# Patient Record
Sex: FEMALE | Race: BLACK OR AFRICAN AMERICAN | Employment: UNEMPLOYED | URBAN - METROPOLITAN AREA
[De-identification: names, ages, dates, MRNs, and addresses within clinical notes are randomized per-mention and may not be internally consistent; named-entity substitution may affect disease eponyms.]

---

## 2020-04-27 ENCOUNTER — NURSE TRIAGE (OUTPATIENT)
Dept: NURSING | Facility: CLINIC | Age: 35
End: 2020-04-27

## 2020-04-27 NOTE — TELEPHONE ENCOUNTER
Diarrhea, nausea, vomiting, chills five days. Developed dry cough two days ago. No fever or shortness of breath.  Home care recommendations given for diarrhea and covid-19 symptom.  Patient asking to be tested.  Referred her to [email or web address] for new Covid symptom.    Lo Blum RN  [facility]    Reason for Disposition    1] COVID-19 infection diagnosed or suspected AND [2] mild symptoms (fever, cough) AND [2] no trouble breathing or other complications    Additional Information    Negative: Shock suspected (e.g., cold/pale/clammy skin, too weak to stand, low BP, rapid pulse)    Negative: Difficult to awaken or acting confused (e.g., disoriented, slurred speech)    Negative: Sounds like a life-threatening emergency to the triager    Negative: [1] SEVERE abdominal pain (e.g., excruciating) AND [2] present > 1 hour    Negative: [1] SEVERE abdominal pain AND [2] age > 60    Negative: [1] Blood in the stool AND [2] moderate or large amount of blood    Negative: Black or tarry bowel movements  (Exception: chronic-unchanged  black-grey bowel movements AND is taking iron pills or Pepto-bismol)    Negative: [1] Drinking very little AND [2] dehydration suspected (e.g., no urine > 12 hours, very dry mouth, very lightheaded)    Negative: Patient sounds very sick or weak to the triager    Negative: [1] SEVERE diarrhea (e.g., 7 or more times / day more than normal) AND [2]  age > 60 years    Negative: [1] Constant abdominal pain AND [2] present > 2 hours    Negative: [1] Fever > 103 F (39.4 C) AND [2] not able to get the fever down using Fever Care Advice    Negative: [1] SEVERE diarrhea (e.g., 7 or more times / day more than normal) AND [2] present > 24 hours (1 day)    Negative: [1] MODERATE diarrhea (e.g., 4-6 times / day more than normal) AND [2] present > 48 hours (2 days)    Negative: [1] MODERATE diarrhea (e.g., 4-6 times / day more than normal) AND [2] age > 70 years    Negative: Fever > 101 F (38.3 C)     Negative: Fever present > 3 days (72 hours)    Negative: Abdominal pain  (Exception: Pain clears with each passage of diarrhea stool)    Negative: [1] Blood in the stool AND [2] small amount of blood   (Exception: only on toilet paper. Reason: diarrhea can cause rectal irritation with blood on wiping)    Negative: [1] Mucus or pus in stool AND [2] present > 2 days AND [3] diarrhea is more than mild    Negative: [1] Recent antibiotic therapy (i.e., within last 2 months) AND [2] diarrhea present > 3 days since antibiotic was stopped    Negative: [1] Recent hospitalization AND [2] diarrhea present > 3 days    Negative: Weak immune system (e.g., HIV positive, cancer chemo, splenectomy, organ transplant, chronic steroids)    Negative: Tube feedings (e.g., nasogastric, g-tube, j-tube)    Negative: Travel to a foreign country in past month    Negative: [1] MILD diarrhea (e.g., 1-3 or more stools than normal in past 24 hours) without known cause AND [2] present >  7 days    Negative: Diarrhea is a chronic symptom (recurrent or ongoing AND present > 4 weeks)    Negative: SEVERE diarrhea (e.g., 7 or more times / day more than normal)    MILD-MODERATE diarrhea (e.g., 1-6 times / day more than normal)    Negative: SEVERE difficulty breathing (e.g., struggling for each breath, speaks in single words)    Negative: Difficult to awaken or acting confused (e.g., disoriented, slurred speech)    Negative: Bluish (or gray) lips or face now    Negative: Shock suspected (e.g., cold/pale/clammy skin, too weak to stand, low BP, rapid pulse)    Negative: Sounds like a life-threatening emergency to the triager    Negative: SEVERE or constant chest pain (Exception: mild central chest pain, present only when coughing)    Negative: MODERATE difficulty breathing (e.g., speaks in phrases, SOB even at rest, pulse 100-120)    Negative: Patient sounds very sick or weak to the triager    Negative: MILD difficulty breathing (e.g., minimal/no SOB at  rest, SOB with walking, pulse <100)    Negative: Chest pain    Negative: Fever > 103 F (39.4 C)    Negative: [1] Fever > 101 F (38.3 C) AND [2] age > 60    Negative: [1] Fever > 100.0 F (37.8 C) AND [2] bedridden (e.g., nursing home patient, CVA, chronic illness, recovering from surgery)    Negative: HIGH RISK patient (e.g., age > 64 years, diabetes, heart or lung disease, weak immune system)    Negative: Fever present > 3 days (72 hours)    Negative: [1] Fever returns after gone for over 24 hours AND [2] symptoms worse or not improved    Negative: [1] Continuous (nonstop) coughing interferes with work or school AND [2] no improvement using cough treatment per protocol    Negative: Cough present > 3 weeks    [facility] Specific Disposition  - REQUIRED: [facility] Specific Patient Instructions  COVID 19 Nurse Triage Plan/Patient Instructions    Please be aware that novel coronavirus (COVID-19) may be circulating in the community. If you develop symptoms such as fever, cough, or SOB or if you have concerns about the presence of another infection including coronavirus (COVID-19), please contact your health care provider or visit [email or web address].       Patient to stay at home and follow home care protocol based instructions.     Thank you for limiting contact with others, wearing a simple mask to cover your cough, practice good hand hygiene habits and accessing our virtual services where possible to limit the spread of this virus.    For more information about COVID19 and options for caring for yourself at home, please visit the CDC website at https://www.cdc.gov/coronavirus/2019-ncov/about/steps-when-sick.html  For more options for care at [facility], please visit our website at [email or web address]    For more information, please use the [place] Department of Health ([facility]) COVID-19 Hotlines (Interpreters available):   Health questions: Phone Number: [phone] or  [phone] and Hours: 7 a.m. to 7 p.m.  Schools and  questions: Phone Number: [phone] or [phone] and Hours 7 a.m. to 7 p.m.    Protocols used: CORONAVIRUS (COVID-19) DIAGNOSED OR [id number] 3.30.20, DIARRHEA-A-AH

## 2020-09-20 ENCOUNTER — APPOINTMENT (OUTPATIENT)
Dept: GENERAL RADIOLOGY | Facility: CLINIC | Age: 35
End: 2020-09-20
Attending: PHYSICIAN ASSISTANT
Payer: MEDICAID

## 2020-09-20 ENCOUNTER — HOSPITAL ENCOUNTER (EMERGENCY)
Facility: CLINIC | Age: 35
Discharge: HOME | End: 2020-09-20
Attending: PHYSICIAN ASSISTANT | Admitting: PHYSICIAN ASSISTANT
Payer: MEDICAID

## 2020-09-20 VITALS
HEIGHT: 63 IN | OXYGEN SATURATION: 96 % | SYSTOLIC BLOOD PRESSURE: 145 MMHG | WEIGHT: 245 LBS | HEART RATE: 82 BPM | TEMPERATURE: 98.3 F | DIASTOLIC BLOOD PRESSURE: 91 MMHG | RESPIRATION RATE: 16 BRPM | BODY MASS INDEX: 43.41 KG/M2

## 2020-09-20 DIAGNOSIS — S52.613A ULNA STYLOID FRACTURE, CLOSED: ICD-10-CM
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
## 2020-09-20 DIAGNOSIS — S52.509A DISTAL RADIAL FRACTURE: ICD-10-CM

## 2020-09-20 PROCEDURE — 40000275 ZZH STATISTIC RCP TIME EA 10 MIN

## 2020-09-20 PROCEDURE — 96374 THER/PROPH/DIAG INJ IV PUSH: CPT

## 2020-09-20 PROCEDURE — 40000277 XR SURGERY CARM FLUORO LESS THAN 5 MIN W STILLS: Mod: TC

## 2020-09-20 PROCEDURE — 99285 EMERGENCY DEPT VISIT HI MDM: CPT | Mod: 25

## 2020-09-20 PROCEDURE — 73100 X-RAY EXAM OF WRIST: CPT | Mod: LT

## 2020-09-20 PROCEDURE — 90471 IMMUNIZATION ADMIN: CPT

## 2020-09-20 PROCEDURE — 25605 CLTX DST RDL FX/EPHYS SEP W/: CPT

## 2020-09-20 PROCEDURE — 96375 TX/PRO/DX INJ NEW DRUG ADDON: CPT

## 2020-09-20 PROCEDURE — 90715 TDAP VACCINE 7 YRS/> IM: CPT | Performed by: PHYSICIAN ASSISTANT

## 2020-09-20 PROCEDURE — 96376 TX/PRO/DX INJ SAME DRUG ADON: CPT

## 2020-09-20 PROCEDURE — 25000128 H RX IP 250 OP 636

## 2020-09-20 PROCEDURE — 25000128 H RX IP 250 OP 636: Performed by: EMERGENCY MEDICINE

## 2020-09-20 PROCEDURE — 40000986 XR WRIST LEFT G/E 3 VIEWS

## 2020-09-20 PROCEDURE — 25000128 H RX IP 250 OP 636: Performed by: PHYSICIAN ASSISTANT

## 2020-09-20 RX ORDER — ONDANSETRON 2 MG/ML
4 INJECTION INTRAMUSCULAR; INTRAVENOUS ONCE
Status: COMPLETED | OUTPATIENT
Start: 2020-09-20 | End: 2020-09-20

## 2020-09-20 RX ORDER — IBUPROFEN 600 MG/1
600 TABLET, FILM COATED ORAL ONCE
Status: DISCONTINUED | OUTPATIENT
Start: 2020-09-20 | End: 2020-09-20

## 2020-09-20 RX ORDER — HYDROMORPHONE HYDROCHLORIDE 1 MG/ML
0.5 INJECTION, SOLUTION INTRAMUSCULAR; INTRAVENOUS; SUBCUTANEOUS
Status: DISCONTINUED | OUTPATIENT
Start: 2020-09-20 | End: 2020-09-20 | Stop reason: HOSPADM

## 2020-09-20 RX ORDER — ONDANSETRON 2 MG/ML
INJECTION INTRAMUSCULAR; INTRAVENOUS
Status: COMPLETED
Start: 2020-09-20 | End: 2020-09-20

## 2020-09-20 RX ORDER — PROPOFOL 10 MG/ML
80 INJECTION, EMULSION INTRAVENOUS ONCE
Status: COMPLETED | OUTPATIENT
Start: 2020-09-20 | End: 2020-09-20

## 2020-09-20 RX ORDER — ACETAMINOPHEN 325 MG/1
975 TABLET ORAL ONCE
Status: DISCONTINUED | OUTPATIENT
Start: 2020-09-20 | End: 2020-09-20

## 2020-09-20 RX ORDER — OXYCODONE HYDROCHLORIDE 5 MG/1
5 TABLET ORAL EVERY 6 HOURS PRN
Qty: 10 TABLET | Refills: 0 | Status: SHIPPED | OUTPATIENT
Start: 2020-09-20

## 2020-09-20 RX ORDER — OXYCODONE HYDROCHLORIDE 5 MG/1
5 TABLET ORAL ONCE
Status: DISCONTINUED | OUTPATIENT
Start: 2020-09-20 | End: 2020-09-20

## 2020-09-20 RX ADMIN — ONDANSETRON 4 MG: 2 INJECTION INTRAMUSCULAR; INTRAVENOUS at 09:48

## 2020-09-20 RX ADMIN — HYDROMORPHONE HYDROCHLORIDE 0.5 MG: 1 INJECTION, SOLUTION INTRAMUSCULAR; INTRAVENOUS; SUBCUTANEOUS at 11:04

## 2020-09-20 RX ADMIN — PROPOFOL 160 MG: 10 INJECTION, EMULSION INTRAVENOUS at 10:40

## 2020-09-20 RX ADMIN — CLOSTRIDIUM TETANI TOXOID ANTIGEN (FORMALDEHYDE INACTIVATED), CORYNEBACTERIUM DIPHTHERIAE TOXOID ANTIGEN (FORMALDEHYDE INACTIVATED), BORDETELLA PERTUSSIS TOXOID ANTIGEN (GLUTARALDEHYDE INACTIVATED), BORDETELLA PERTUSSIS FILAMENTOUS HEMAGGLUTININ ANTIGEN (FORMALDEHYDE INACTIVATED), BORDETELLA PERTUSSIS PERTACTIN ANTIGEN, AND BORDETELLA PERTUSSIS FIMBRIAE 2/3 ANTIGEN 0.5 ML: 5; 2; 2.5; 5; 3; 5 INJECTION, SUSPENSION INTRAMUSCULAR at 09:46

## 2020-09-20 RX ADMIN — HYDROMORPHONE HYDROCHLORIDE 0.5 MG: 1 INJECTION, SOLUTION INTRAMUSCULAR; INTRAVENOUS; SUBCUTANEOUS at 09:46

## 2020-09-20 ASSESSMENT — MIFFLIN-ST. JEOR: SCORE: 1780.44

## 2020-09-20 ASSESSMENT — ENCOUNTER SYMPTOMS: NUMBNESS: 1

## 2020-09-20 NOTE — ED AVS SNAPSHOT
[facility] Emergency Department  [address]  [facility] [address]  Phone:  [phone]  Fax:  [phone]                                    Kristin Mane   MRN: [id number]    Department:  [facility] Emergency Department   Date of Visit:  9/20/2020           After Visit Summary Signature Page    I have received my discharge instructions, and my questions have been answered. I have discussed any challenges I see with this plan with the nurse or doctor.    ..........................................................................................................................................  Patient/Patient Representative Signature      ..........................................................................................................................................  Patient Representative Print Name and Relationship to Patient    ..................................................               ................................................  Date                                   Time    ..........................................................................................................................................  Reviewed by Signature/Title    ...................................................              ..............................................  Date                                               Time          22EPIC Rev 08/18

## 2020-09-20 NOTE — ED PROVIDER NOTES
"History     Chief Complaint:  Wrist Pain    HPI  Kristin Mane is a right-handed 35 year old female who presents for evaluation of wrist pain. The patient states that she was stepping up on an exercise box when she fell backwards and landed on her wrist in dorsiflexion. She states that her entire hand feels numb. She did not hit her head and has no history of fracture/surgery in the wrist.    Allergies:  No Known Drug Allergies      Medications:   Zofran   Imodium    Medical History:   Past medical history reviewed. No pertinent medical history.     Surgical History   Surgical history reviewed. No pertinent surgical history.     Family History:   Family history reviewed. No pertinent family history.     Social History:  Patient was accompanied to the ED by her friend.  Smoking Status: Negative   Smokeless Tobacco: Negative   Alcohol Use: Negative   Drug Use: Negative     Review of Systems   Musculoskeletal:        Wrist pain   Neurological: Positive for numbness.   All other systems reviewed and are negative.    Physical Exam     Patient Vitals for the past 24 hrs:   BP Temp Pulse Resp SpO2 Height Weight   09/20/20 1242 -- -- -- 16 -- -- --   09/20/20 1230 (!) 145/91 -- 82 15 96 % -- --   09/20/20 1200 -- -- 77 13 100 % -- --   09/20/20 1159 (!) 141/83 -- 61 -- -- -- --   09/20/20 1115 135/81 -- 73 18 99 % -- --   09/20/20 1100 (!) 141/92 -- 76 (!) 33 100 % -- --   09/20/20 1057 134/82 -- 92 -- -- -- --   09/20/20 1054 (!) 145/90 -- 83 12 100 % -- --   09/20/20 1051 (!) 151/103 -- 80 (!) 38 100 % -- --   09/20/20 1048 (!) 144/99 -- 90 (!) 37 100 % -- --   09/20/20 1045 (!) 141/103 -- 96 9 100 % -- --   09/20/20 1040 (!) 134/99 -- 75 9 100 % -- --   09/20/20 1035 135/86 -- 73 13 100 % -- --   09/20/20 1030 132/82 -- 78 20 99 % -- --   09/20/20 1022 127/88 -- 78 -- 100 % -- --   09/20/20 1020 -- -- -- -- -- 1.6 m (5' 3\") 111.1 kg (245 lb)   09/20/20 1000 -- -- -- -- 100 % -- --   09/20/20 0930 (!) 147/95 -- -- -- 99 % " -- --   09/20/20 0927 (!) 147/95 98.3  F (36.8  C) 113 28 99 % -- --        Physical Exam  General: Well appearing, well nourished.  Appears uncomfortable, resting in bed.  Skin: Abrasion to the lateral aspect of the hand, dorsal side.  Superficial in nature.  No visualized bone to the wound.  HEENT: Head: Normocephalic, atraumatic, no visible masses.   Eyes: Conjunctiva clear.  Cardiac: Normal rate and regular rhythm, no murmur or gallop.   Lungs: Clear to auscultation.  Abdomen: Abdomen soft, non-tender. No rebound tenderness of guarding.   Musculoskeletal: Normal gait and station.  Left wrist / hand: Obvious deformity to the left wrist.  Tenderness throughout the distal ulna and radius.  No snuffbox tenderness.  No tenderness palpation throughout the remaining hand or finger bones.  Unable to have flexion-extension of the wrist due to pain.  Able to have movement of all digits of the left hand.  There are no sensory deficits, Median, Ulnar, and Radial nerve function is normal. Radial artery pulsations are normal. Capillary refill is normal.     Left elbow: No tenderness to palpation throughout the elbow.  Full flexion, extension, supination, pronation of elbow without difficulty.  Neurologic: Oriented x 3. GCS: 15.  Psychiatric: Intact recent and remote memory, judgment and insight, normal mood and affect.     Emergency Department Course     Imaging:  Radiology results were communicated with the patient who voiced understanding of the findings.    XR Wrist Left G/E 3 Views  Significant interval reduction in displacement of the  comminuted intra-articular distal radius fracture, there is residual  radial displacement of the distal articular fragments by as much as  one half bone width, and mild overriding along the radial aspect of  fracture. Persistent radial displacement of the ulnar styloid  fracture.    XR Wrist Left 2 Views  Comminuted displaced intra-articular fracture of the  distal radius. Articular  fragments are displaced dorsally and radially  by 1 bone width and there is substantial overriding of fragments.  Displaced fracture of the ulnar styloid, ulnar styloid is displaced  radially, dorsally, approximately along with the displaced radial  articular fragments.    JOIE SPEARS MD    Reading per radiology     Procedure:  Sedation and reduction - see Dr. Corona's note      Narrative: Sugartong splint        Splint was applied and after placement I checked and adjusted the fit to ensure proper positioning. Patient was more comfortable with splint in place. Sensation and circulation are intact after splint placement.  Interventions:   0946 Dilaudid 0.5 mg IV   0946 Tdap 0.5 mL IM  1948 Zofran 4 mg IV    1040 160 mg IV    Emergency Department Course:    0923 Nursing notes and vitals reviewed.    0928 I performed an exam of the patient as documented above.     1022 The patient was sent for XR while in the emergency department, results above.    1141 The patient was sent for a XR while in the emergency department, results above.      1155 I spoke with Dr. Dial of the orthopedic hand service regarding patient's presentation, findings, and plan of care.     I again spoke with Dr. Dial.     1031 Findings and plan explained to the Patient. Patient discharged home with instructions regarding supportive care, medications, and reasons to return. The importance of close follow-up was reviewed. The patient was prescribed as below.    Impression & Plan     Medical Decision Making:  Kristin Mane is a 35 year old female who presented to the ED for evaluation of left wrist pain after fall.  Details the patient's history can be known the HPI.  Differentials considered include sprain, strain, fracture, dislocation, septic joint, compartment syndrome, others.  On my exam, patient obvious deformity to the left wrist.  She is otherwise neurovascularly intact.  Plain films obtained as noted above, showing a comminuted  displaced intra-articular distal radius fracture as well as a ulnar styloid fracture.  Due to significant displacement, we did have sedation with reduction completed with Dr. Mensah.  Please see his note for details. Due to the unstable nature of the fracture, it was difficult maintaining the reduction.  She was placed in a sugar tong splint.  Postreduction films noted above, showing some improvement, but persistent radial angulation of the distal radius fracture.  Given the persistent angulation, I did touch base with orthopedics, who note that she will need surgery, they will call her in the morning to set up this for hopefully the next 2 days.  She is prophylactically been swabbed for COVID-19 asymptomatically, in anticipation for surgical correction.  After pain medications as noted above, reduction and casting, patient felt improvement in her symptoms.  We will discharge her with oxycodone.  She will otherwise continue Tylenol and ibuprofen, ice, elevation.  She is also placed in a sling.  Return to the ER for changing worsening symptoms, numbness or color change in the hand, new concerns.  No other injuries noted head to toe trauma exam.  All questions were answered prior to discharge.  She is in agreement with the treatment plan as above.      Diagnosis:     ICD-10-CM    1. Distal radial fracture  S52.509A    2. Ulna styloid fracture, closed  S52.613A    3. Fall, initial encounter  W19.XXXA         Disposition:  Discharged to home.    Discharge Medications:  Discharge Medication List as of 9/20/2020 12:33 PM      START taking these medications    Details   oxyCODONE (ROXICODONE) 5 MG tablet Take 1 tablet (5 mg) by mouth every 6 hours as needed for pain, Disp-10 tablet,R-0, Local Print             Dragon Disclosure   This was created at least in part with a voice recognition software. Mistakes/typos may be present.      Scribe Disclosure:  IAmrit, am serving as a scribe at 9:33 AM on 9/20/2020 to  document services personally performed by Shawnee BECKER based on my observations and the provider's statements to me.     [place]     Shawnee Flores PA  09/20/20 1324       Shawnee Flores PA  09/20/20 1327      ADDENDUM:  Patient was not swabbed for COVID-19 at discharge, she left before this could be completed.  Both myself and nursing staff attempted to call her number on file, which we had confirmed prior to her discharge.  However, this number went to an individual who is not the patient.  We also attempted to call her emergency contact, who's number went to busy signal.      Shawnee Flores PA  09/20/20 [id number]

## 2020-09-20 NOTE — PROGRESS NOTES
RT at bedside to assist MD with conscious sedation. Vital signs monitored throughout procedure, including heart rate, RR, SpO2 and ETCO2. BVM, suction, and nasal/oral airways at bedside. Patient tolerated procedure well and was awake and alert at the end of procedure.

## 2020-09-20 NOTE — ED PROVIDER NOTES
Emergency Department Attending Supervision Note  9/20/2020  9:37 AM      I evaluated this patient in conjunction with Shawnee Flores PA.      Briefly, the patient presented with left wrist pain and deformity after falling backwards when trying to step up on an exercise box. She notes numbness in her left hand. She did not hit her head or lose consciousness during the accident.     Tdap: 1993    General: Patient is alert, awake and interactive when I enter the room  Head: The scalp, face, and head appear normal  Eyes: Conjunctivae are normal  ENT: The nose is normal, Pinnae are normal, External acoustic canals are normal  Neck: Trachea midline  CV: Pulses are normal.   Resp: No respiratory distress   Musc: Obvious deformity to the left wrist with abrasion on the ulnar side that does not appear to be a open fracture.  Patient is distally neurovascularly intact.  Decreased range of motion at the wrist secondary to pain and deformity.  No cervical thoracic or lumbar midline tenderness.  Skin: Abrasion on the ulnar aspect of the wrist.  Neuro: Speech is normal and fluent. Face is symmetric.   Psych: Normal affect.  Appropriate interactions.    Results:  Imaging:  XR Wrist, Left, G/E 2 views:   Comminuted displaced intra-articular fracture of the distal radius. Articular fragments are displaced dorsally and radially by 1 bone width and there is substantial overriding of fragments. Displaced fracture of the ulnar styloid, ulnar styloid is displaced radially, dorsally, approximately along with the displaced radial articular fragments. As per radiology.     XR Wrist, Left, G/E 3 views:   Significant interval reduction in displacement of the comminuted intra-articular distal radius fracture, there is residual radial displacement of the distal articular fragments by as much as one half bone width, and mild overriding along the radial aspect of fracture. Persistent radial displacement of the ulnar styloid fracture. As  per radiology.    Laboratory:  Asymptomatic COVID-19: Pending    Procedures:   [facility] Procedure Note        Sedation     Performed by: Evangelist Corona MD  Authorized by: Evangelist Corona MD    Pre-Procedure Assessment done at 1030.    Expected Level:  Moderate Sedation    Indication:  Sedation is required to allow for fracture reduction    Consent obtained from patient after discussing the risks, benefits and alternatives.    PO Intake:  Appropriately NPO for procedure    ASA Class:  Class 1 - HEALTHY PATIENT    Mallampati:  Grade 1:  Soft palate, uvula, tonsillar pillars, and posterior pharyngeal wall visible    Lungs: Lungs Clear with good breath sounds bilaterally.     Heart: Normal heart sounds and rate    History and physical reviewed and no updates needed. I have reviewed the lab findings, diagnostic data, medications, and the plan for sedation. I have determined this patient to be an appropriate candidate for the planned sedation and procedure and have reassessed the patient IMMEDIATELY PRIOR to sedation and procedure.      Sedation Post Procedure Summary:    Prior to the start of the procedure and with procedural staff participation, I verbally confirmed the patient s identity using two indicators, relevant allergies, that the procedure was appropriate and matched the consent or emergent situation, and that the correct equipment/implants were available. Immediately prior to starting the procedure I conducted the Time Out with the procedural staff and re-confirmed the patient s name, procedure, and site/side. (The Joint Commission universal protocol was followed.)  Yes      Sedatives: Propofol    Vital signs, airway, End Tidal CO2 and pulse oximetry were monitored and remained stable throughout the procedure and sedation was maintained until the procedure was complete.  The patient was monitored by staff until sedation discharge criteria were met.    Patient tolerance: Patient  tolerated the procedure well with no immediate complications.    Time of sedation in minutes: 20 minutes from beginning to end of physician one to one monitoring.      Fracture Reduction     SITE: Left wrist     CONSENT: Risks and benefits, along with alternative treatment modalities, were discussed with the patient.  The patient consented to the procedure verbally and signed the proper paperwork.    ANESTHESIA:  The patient was consciously sedated by me, Dr. Corona (please see sedation note above)    TECHNIQUE: Traction was applied and angulation was recreated and reduced.  Good alignment was confirmed by fluoroscopy and post reduction films were obtained.  The patient tolerated the procedure well and there were no complications.     OUTCOME:  Rechecked the patient after sedation was no longer present, findings and plan explained to the patient. Patients status improved.  Pain was reduced and feeling more comfortably.     Medications:  0946 Dilaudid 0.5 mg IV  0946 Tdap 0.5 mL IM  0948 Zofran 4 mg IV   1040 Diprivan 160 mg IV    ED course:  Nursing notes and vitals reviewed. 0937 I performed an exam of the patient as documented above.     IV inserted. Medicine administered as documented above. Blood drawn. This was sent to the lab for further testing, results above.    The patient was sent for a left wrist x-ray while in the emergency department, findings above.     1030 I rechecked the patient and discussed the results of her workup thus far. I sedated the patient and reduced the fracture, see procedure notes above.     3121  Shawnee consulted with Dr. Dial of orthopedics, see her note for further details.     Patient was discharged home.    My impression  Patient presents with obvious deformity to the left wrist. X rays show comminuted displaced intra-articular distal radius fracture as well as a ulnar styloid fracture. Fracture was reduced as above. Postreduction films noted above, showing some improvement,  but persistent radial angulation of the distal radius fracture.  Given the persistent angulation and intraarticular nature of the fracture, Shawnee did touch base with orthopedics, who note that she will need surgery, they will call her in the morning to set up this for hopefully the next 2 days. Following the procedure, she is distally neurovascularly intact.     Diagnosis    ICD-10-CM    1. Distal radial fracture  S52.509A    2. Ulna styloid fracture, closed  S52.613A    3. Fall, initial encounter  W19.XXXA        Scribe Disclosure:  IShalini, am serving as a scribe at 9:36 AM on 9/20/2020 to document services personally performed by Evangelist Corona MD, based on my observations and the provider's statements to me.          Evangelist Corona MD  09/29/20 1551

## 2020-09-20 NOTE — ED NOTES
Attempted to contact patient to return for COVID swab that was not completed on this patient. Patient's primary contact information was the wrong number. Attempted to contact patient's sister, who is emergency contact, and got a busy signal.

## 2020-09-20 NOTE — SEDATION DOCUMENTATION
Patient given conscious sedation at the direction of Dr. Corona . Resp. Status was continuously monitored by Susi RT with normal ET CO2 with ranges in high 30's -39 maintained airway with no distress. VSS , left wrist which was displaced was realigned and casted . Wrist very unstable and diffecult to keep in alignment

## 2020-09-20 NOTE — DISCHARGE INSTRUCTIONS
You have broken the radius and ulna bones in your wrist. You can use Tylenol ibuprofen first for pain control. You can take up to 1000 mg or 1 g of Tylenol.  You can take 600 mg of ibuprofen at one time.  You can take these together every 6 hours if needed.  Always take ibuprofen with food.  Never take more than 4 g (4000 mg) of Tylenol in 1 day.  Do not take this amount for more than 1 week at a time.  If pain is not controlled, use oxycodone.  You can ice over the splint.  Call [facility] orthopedics tomorrow and schedule follow-up.  Return for uncontrolled pain, new concerns.  Wear the sling for comfort throughout the day. Orthopedics should call you tomorrow to schedule the surgery.     Discharge Instructions  Splint Care    You had a splint put on today to help protect your injury and help it heal.  Splints are used to treat things like strains, sprains, cuts and fractures (broken bones).    Be sure your splint is not too tight!  If you splint is too tight, it may cause loss of blood supply.  Signs of your splint being too tight include:  your arm or leg hurting a lot more; your fingers or toes getting numb, cold, pale or blue; or your child is crying, fussing or seeming restless.    Return to the Emergency Department right away if:  You have increased pain or pressure around the injury.  You have numbness, tingling, or cool, pale, or blue toes or fingers past the injury.  Your child is more fussy than normal, crying a lot, or restless.  Your splint becomes soft, breaks, or is wet.  Your splint begins to smell bad.  Your splint is cutting into your skin.    Home care:  Keep the injured area above the level of your heart while laying or sitting down.  This will help decrease the swelling and the pain.  Keep the splint dry.  Do not put objects down or inside the splint.  If there is an elastic bandage (Ace  wrap) holding the splint on this may be loosened slightly to relieve pressure or pain.  If pain continues  return to the Emergency Department right away.  Do not remove your splint by yourself unless told to by your doctor.    Follow-up:  Sometimes the splint put on in the Emergency Department needs to be changed once the swelling has gone down and a more permanent cast needs to be placed.  This is usually done by a bone specialist doctor (Orthopedist).  Follow the instructions given to you by your doctor today.    X-rays:  X-rays done today were read by your doctor but will also be read by a radiologist.  We will contact you if the radiologist sees anything different on the x-ray.  Your regular doctor may also want to review your x-rays on follow-up.    You could have a fracture (break), even if we told you your x-rays were normal. X-rays are not always certain, and some fractures are hard to see and may not show up right away.  Also, your x-ray may look like you have a fracture, even though you do not.  It is important to follow-up with your regular doctor.     If you were given a prescription for medicine here today, be sure to read all of the information (including the package insert) that comes with your prescription.  This will include important information about the medicine, its side effects, and any warnings that you need to know about.  The pharmacist who fills the prescription can provide more information and answer questions you may have about the medicine.  If you have questions or concerns that the pharmacist cannot address, please call or return to the Emergency Department.   Opioid Medication Information    Pain medications are among the most commonly prescribed medicines, so we are including this information for all our patients. If you did not receive pain medication or get a prescription for pain medicine, you can ignore it.     You may have been given a prescription for an opioid (narcotic) pain medicine and/or have received a pain medicine while here in the Emergency Department. These medicines can  make you drowsy or impaired. You must not drive, operate dangerous equipment, or engage in any other dangerous activities while taking these medications. If you drive while taking these medications, you could be arrested for DUI, or driving under the influence. Do not drink any alcohol while you are taking these medications.     Opioid pain medications can cause addiction. If you have a history of chemical dependency of any type, you are at a higher risk of becoming addicted to pain medications.  Only take these prescribed medications to treat your pain when all other options have been tried. Take it for as short a time and as few doses as possible. Store your pain pills in a secure place, as they are frequently stolen and provide a dangerous opportunity for children or visitors in your house to start abusing these powerful medications. We will not replace any lost or stolen medicine.  As soon as your pain is better, you should flush all your remaining medication.     Many prescription pain medications contain Tylenol  (acetaminophen), including Vicodin , Tylenol #3 , Norco , Lortab , and Percocet .  You should not take any extra pills of Tylenol  if you are using these prescription medications or you can get very sick.  Do not ever take more than 3000 mg of acetaminophen in any 24 hour period.    All opioids tend to cause constipation. Drink plenty of water and eat foods that have a lot of fiber, such as fruits, vegetables, prune juice, apple juice and high fiber cereal.  Take a laxative if you don t move your bowels at least every other day. Miralax , Milk of Magnesia, Colace , or Senna  can be used to keep you regular.      Remember that you can always come back to the Emergency Department if you are not able to see your regular doctor in the amount of time listed above, if you get any new symptoms, or if there is anything that worries you.

## 2020-09-21 ENCOUNTER — TELEPHONE (OUTPATIENT)
Dept: ORTHOPEDICS | Facility: CLINIC | Age: 35
End: 2020-09-21

## 2020-09-21 NOTE — TELEPHONE ENCOUNTER
Unsure of why patient is calling back since [facility] has already scheduled her surgery. Messages may have crossed.    Phone call to patient and she got everything handled and has no further questions.     RADHA Ramos RN

## 2020-09-21 NOTE — PLAN OF CARE
"When calling patient in preparation for surgery patient stated that she was positive for Covid 19 \"a few\" months ago and has been asymptomatic since beginning of August 2020. She could not remember exact dates. Above information given to infection prevention nurse, Rebecca Hatch says we need to have a record of the date of testing. Message left on patient cell x2 regarding place of testing but she has not returned phone call.  "

## 2020-09-21 NOTE — TELEPHONE ENCOUNTER
" states patient called stating she needs to get in today after being seen in the ER yesterday.   Call was disconnected accidentally.     Phone call to home number listed: [phone] and a male answered. He states it is the wrong number and \"you need to stop calling.\" He received 4 calls yesterday and one call today. Apologized and let him know writer was calling from a doctor's office.     Patient left voicemail on triage line and writer able to get correct number. Her chart had numbers transposed, corrected home and cell number in Epic.     Per chart review, patient consulted with provider with [facility] and they were supposed to be calling her today to set up surgery. Phone call to patient and informed of the above. Provided phone number for [facility]. She verbalized understanding.   She asked for our number in case she had issues, and triage number provided.     RADHA Ramos RN        "

## 2020-09-22 ENCOUNTER — APPOINTMENT (OUTPATIENT)
Dept: GENERAL RADIOLOGY | Facility: CLINIC | Age: 35
End: 2020-09-22
Attending: ORTHOPAEDIC SURGERY
Payer: MEDICAID

## 2020-09-22 ENCOUNTER — HOSPITAL ENCOUNTER (OUTPATIENT)
Facility: CLINIC | Age: 35
Discharge: HOME | End: 2020-09-22
Attending: ORTHOPAEDIC SURGERY | Admitting: ORTHOPAEDIC SURGERY
Payer: MEDICAID

## 2020-09-22 ENCOUNTER — ANESTHESIA EVENT (OUTPATIENT)
Dept: SURGERY | Facility: CLINIC | Age: 35
End: 2020-09-22
Payer: MEDICAID

## 2020-09-22 ENCOUNTER — ANESTHESIA (OUTPATIENT)
Dept: SURGERY | Facility: CLINIC | Age: 35
End: 2020-09-22
Payer: MEDICAID

## 2020-09-22 VITALS
TEMPERATURE: 97.8 F | RESPIRATION RATE: 14 BRPM | HEART RATE: 107 BPM | SYSTOLIC BLOOD PRESSURE: 151 MMHG | WEIGHT: 249 LBS | OXYGEN SATURATION: 100 % | HEIGHT: 65 IN | BODY MASS INDEX: 41.48 KG/M2 | DIASTOLIC BLOOD PRESSURE: 77 MMHG

## 2020-09-22 DIAGNOSIS — G89.18 POSTOPERATIVE PAIN: Primary | ICD-10-CM

## 2020-09-22 DIAGNOSIS — Z01.812 PRE-OPERATIVE LABORATORY EXAMINATION: ICD-10-CM

## 2020-09-22 LAB
HCG UR QL: NEGATIVE
LABORATORY COMMENT REPORT: NORMAL
SARS-COV-2 RNA SPEC QL NAA+PROBE: NEGATIVE
SARS-COV-2 RNA SPEC QL NAA+PROBE: NORMAL
SPECIMEN SOURCE: NORMAL
SPECIMEN SOURCE: NORMAL

## 2020-09-22 PROCEDURE — 25000128 H RX IP 250 OP 636: Performed by: ANESTHESIOLOGY

## 2020-09-22 PROCEDURE — 25000128 H RX IP 250 OP 636: Performed by: ORTHOPAEDIC SURGERY

## 2020-09-22 PROCEDURE — 25000128 H RX IP 250 OP 636: Performed by: NURSE ANESTHETIST, CERTIFIED REGISTERED

## 2020-09-22 PROCEDURE — 27211024 ZZHC OR SUPPLY OTHER OPNP: Performed by: ORTHOPAEDIC SURGERY

## 2020-09-22 PROCEDURE — 37000009 ZZH ANESTHESIA TECHNICAL FEE, EACH ADDTL 15 MIN: Performed by: ORTHOPAEDIC SURGERY

## 2020-09-22 PROCEDURE — 25000125 ZZHC RX 250: Performed by: NURSE ANESTHETIST, CERTIFIED REGISTERED

## 2020-09-22 PROCEDURE — 81025 URINE PREGNANCY TEST: CPT | Performed by: ANESTHESIOLOGY

## 2020-09-22 PROCEDURE — 71000027 ZZH RECOVERY PHASE 2 EACH 15 MINS: Performed by: ORTHOPAEDIC SURGERY

## 2020-09-22 PROCEDURE — 71000012 ZZH RECOVERY PHASE 1 LEVEL 1 FIRST HR: Performed by: ORTHOPAEDIC SURGERY

## 2020-09-22 PROCEDURE — 27210794 ZZH OR GENERAL SUPPLY STERILE: Performed by: ORTHOPAEDIC SURGERY

## 2020-09-22 PROCEDURE — 25000132 ZZH RX MED GY IP 250 OP 250 PS 637: Performed by: ANESTHESIOLOGY

## 2020-09-22 PROCEDURE — U0003 INFECTIOUS AGENT DETECTION BY NUCLEIC ACID (DNA OR RNA); SEVERE ACUTE RESPIRATORY SYNDROME CORONAVIRUS 2 (SARS-COV-2) (CORONAVIRUS DISEASE [COVID-19]), AMPLIFIED PROBE TECHNIQUE, MAKING USE OF HIGH THROUGHPUT TECHNOLOGIES AS DESCRIBED BY CMS-2020-01-R: HCPCS | Performed by: ORTHOPAEDIC SURGERY

## 2020-09-22 PROCEDURE — 36000060 ZZH SURGERY LEVEL 3 W FLUORO 1ST 30 MIN: Performed by: ORTHOPAEDIC SURGERY

## 2020-09-22 PROCEDURE — C1713 ANCHOR/SCREW BN/BN,TIS/BN: HCPCS | Performed by: ORTHOPAEDIC SURGERY

## 2020-09-22 PROCEDURE — 25000125 ZZHC RX 250: Performed by: ORTHOPAEDIC SURGERY

## 2020-09-22 PROCEDURE — 25800030 ZZH RX IP 258 OP 636: Performed by: ANESTHESIOLOGY

## 2020-09-22 PROCEDURE — 40000306 ZZH STATISTIC PRE PROC ASSESS II: Performed by: ORTHOPAEDIC SURGERY

## 2020-09-22 PROCEDURE — 40000277 XR SURGERY CARM FLUORO LESS THAN 5 MIN W STILLS: Mod: TC

## 2020-09-22 PROCEDURE — 71000013 ZZH RECOVERY PHASE 1 LEVEL 1 EA ADDTL HR: Performed by: ORTHOPAEDIC SURGERY

## 2020-09-22 PROCEDURE — 37000008 ZZH ANESTHESIA TECHNICAL FEE, 1ST 30 MIN: Performed by: ORTHOPAEDIC SURGERY

## 2020-09-22 PROCEDURE — 36000058 ZZH SURGERY LEVEL 3 EA 15 ADDTL MIN: Performed by: ORTHOPAEDIC SURGERY

## 2020-09-22 DEVICE — IMPLANTABLE DEVICE: Type: IMPLANTABLE DEVICE | Site: WRIST | Status: FUNCTIONAL

## 2020-09-22 DEVICE — IMP PEG HANDINN SUBCHONDRAL 2.0X22MM P22000: Type: IMPLANTABLE DEVICE | Site: WRIST | Status: FUNCTIONAL

## 2020-09-22 DEVICE — IMP PEG HANDINN SUBCHONDRAL 2.0X20MM P20000: Type: IMPLANTABLE DEVICE | Site: WRIST | Status: FUNCTIONAL

## 2020-09-22 DEVICE — IMP SCR CORTICAL HANDINN 3.5X13MM CS13000: Type: IMPLANTABLE DEVICE | Site: WRIST | Status: FUNCTIONAL

## 2020-09-22 DEVICE — IMP PLATE HANDINN VOLAR SHORT LT DVRASL: Type: IMPLANTABLE DEVICE | Site: WRIST | Status: FUNCTIONAL

## 2020-09-22 DEVICE — IMP PEG HANDINN SUBCHONDRAL 2.0X18MM P18000: Type: IMPLANTABLE DEVICE | Site: WRIST | Status: FUNCTIONAL

## 2020-09-22 RX ORDER — KETAMINE HYDROCHLORIDE 10 MG/ML
INJECTION INTRAMUSCULAR; INTRAVENOUS PRN
Status: DISCONTINUED | OUTPATIENT
Start: 2020-09-22 | End: 2020-09-22

## 2020-09-22 RX ORDER — OXYCODONE HYDROCHLORIDE 5 MG/1
5 TABLET ORAL ONCE
Status: COMPLETED | OUTPATIENT
Start: 2020-09-22 | End: 2020-09-22

## 2020-09-22 RX ORDER — FENTANYL CITRATE 50 UG/ML
INJECTION, SOLUTION INTRAMUSCULAR; INTRAVENOUS PRN
Status: DISCONTINUED | OUTPATIENT
Start: 2020-09-22 | End: 2020-09-22

## 2020-09-22 RX ORDER — IBUPROFEN 600 MG/1
600 TABLET, FILM COATED ORAL EVERY 6 HOURS PRN
Qty: 30 TABLET | Refills: 0 | Status: SHIPPED | OUTPATIENT
Start: 2020-09-22

## 2020-09-22 RX ORDER — ONDANSETRON 2 MG/ML
4 INJECTION INTRAMUSCULAR; INTRAVENOUS EVERY 30 MIN PRN
Status: DISCONTINUED | OUTPATIENT
Start: 2020-09-22 | End: 2020-09-22 | Stop reason: HOSPADM

## 2020-09-22 RX ORDER — BUPIVACAINE HYDROCHLORIDE 5 MG/ML
INJECTION, SOLUTION EPIDURAL; INTRACAUDAL PRN
Status: DISCONTINUED | OUTPATIENT
Start: 2020-09-22 | End: 2020-09-22 | Stop reason: HOSPADM

## 2020-09-22 RX ORDER — LIDOCAINE HYDROCHLORIDE 10 MG/ML
INJECTION, SOLUTION INFILTRATION; PERINEURAL PRN
Status: DISCONTINUED | OUTPATIENT
Start: 2020-09-22 | End: 2020-09-22

## 2020-09-22 RX ORDER — LABETALOL 20 MG/4 ML (5 MG/ML) INTRAVENOUS SYRINGE
10
Status: DISCONTINUED | OUTPATIENT
Start: 2020-09-22 | End: 2020-09-22 | Stop reason: HOSPADM

## 2020-09-22 RX ORDER — LIDOCAINE 40 MG/G
CREAM TOPICAL
Status: DISCONTINUED | OUTPATIENT
Start: 2020-09-22 | End: 2020-09-22 | Stop reason: HOSPADM

## 2020-09-22 RX ORDER — OXYCODONE HYDROCHLORIDE 5 MG/1
5-10 TABLET ORAL EVERY 4 HOURS PRN
Qty: 16 TABLET | Refills: 0 | Status: SHIPPED | OUTPATIENT
Start: 2020-09-22

## 2020-09-22 RX ORDER — PROPOFOL 10 MG/ML
INJECTION, EMULSION INTRAVENOUS CONTINUOUS PRN
Status: DISCONTINUED | OUTPATIENT
Start: 2020-09-22 | End: 2020-09-22

## 2020-09-22 RX ORDER — ONDANSETRON 4 MG/1
4 TABLET, ORALLY DISINTEGRATING ORAL EVERY 30 MIN PRN
Status: DISCONTINUED | OUTPATIENT
Start: 2020-09-22 | End: 2020-09-22 | Stop reason: HOSPADM

## 2020-09-22 RX ORDER — HYDROMORPHONE HYDROCHLORIDE 1 MG/ML
.3-.5 INJECTION, SOLUTION INTRAMUSCULAR; INTRAVENOUS; SUBCUTANEOUS EVERY 10 MIN PRN
Status: DISCONTINUED | OUTPATIENT
Start: 2020-09-22 | End: 2020-09-22 | Stop reason: HOSPADM

## 2020-09-22 RX ORDER — CEFAZOLIN SODIUM 2 G/100ML
2 INJECTION, SOLUTION INTRAVENOUS
Status: COMPLETED | OUTPATIENT
Start: 2020-09-22 | End: 2020-09-22

## 2020-09-22 RX ORDER — ONDANSETRON 2 MG/ML
INJECTION INTRAMUSCULAR; INTRAVENOUS PRN
Status: DISCONTINUED | OUTPATIENT
Start: 2020-09-22 | End: 2020-09-22

## 2020-09-22 RX ORDER — SODIUM CHLORIDE, SODIUM LACTATE, POTASSIUM CHLORIDE, CALCIUM CHLORIDE 600; 310; 30; 20 MG/100ML; MG/100ML; MG/100ML; MG/100ML
INJECTION, SOLUTION INTRAVENOUS CONTINUOUS
Status: DISCONTINUED | OUTPATIENT
Start: 2020-09-22 | End: 2020-09-22 | Stop reason: HOSPADM

## 2020-09-22 RX ORDER — PROPOFOL 10 MG/ML
INJECTION, EMULSION INTRAVENOUS PRN
Status: DISCONTINUED | OUTPATIENT
Start: 2020-09-22 | End: 2020-09-22

## 2020-09-22 RX ORDER — CEFAZOLIN SODIUM 1 G/3ML
1 INJECTION, POWDER, FOR SOLUTION INTRAMUSCULAR; INTRAVENOUS SEE ADMIN INSTRUCTIONS
Status: DISCONTINUED | OUTPATIENT
Start: 2020-09-22 | End: 2020-09-22 | Stop reason: HOSPADM

## 2020-09-22 RX ORDER — MEPERIDINE HYDROCHLORIDE 25 MG/ML
12.5 INJECTION INTRAMUSCULAR; INTRAVENOUS; SUBCUTANEOUS
Status: DISCONTINUED | OUTPATIENT
Start: 2020-09-22 | End: 2020-09-22 | Stop reason: HOSPADM

## 2020-09-22 RX ORDER — DEXAMETHASONE SODIUM PHOSPHATE 4 MG/ML
INJECTION, SOLUTION INTRA-ARTICULAR; INTRALESIONAL; INTRAMUSCULAR; INTRAVENOUS; SOFT TISSUE PRN
Status: DISCONTINUED | OUTPATIENT
Start: 2020-09-22 | End: 2020-09-22

## 2020-09-22 RX ORDER — NALOXONE HYDROCHLORIDE 0.4 MG/ML
.1-.4 INJECTION, SOLUTION INTRAMUSCULAR; INTRAVENOUS; SUBCUTANEOUS
Status: DISCONTINUED | OUTPATIENT
Start: 2020-09-22 | End: 2020-09-22 | Stop reason: HOSPADM

## 2020-09-22 RX ORDER — FENTANYL CITRATE 50 UG/ML
25-50 INJECTION, SOLUTION INTRAMUSCULAR; INTRAVENOUS
Status: DISCONTINUED | OUTPATIENT
Start: 2020-09-22 | End: 2020-09-22 | Stop reason: HOSPADM

## 2020-09-22 RX ORDER — GLYCOPYRROLATE 0.2 MG/ML
INJECTION, SOLUTION INTRAMUSCULAR; INTRAVENOUS PRN
Status: DISCONTINUED | OUTPATIENT
Start: 2020-09-22 | End: 2020-09-22

## 2020-09-22 RX ORDER — HYDRALAZINE HYDROCHLORIDE 20 MG/ML
2.5-5 INJECTION INTRAMUSCULAR; INTRAVENOUS EVERY 10 MIN PRN
Status: DISCONTINUED | OUTPATIENT
Start: 2020-09-22 | End: 2020-09-22 | Stop reason: HOSPADM

## 2020-09-22 RX ADMIN — HYDROMORPHONE HYDROCHLORIDE 1 MG: 1 INJECTION, SOLUTION INTRAMUSCULAR; INTRAVENOUS; SUBCUTANEOUS at 15:37

## 2020-09-22 RX ADMIN — ONDANSETRON HYDROCHLORIDE 4 MG: 2 INJECTION, SOLUTION INTRAVENOUS at 15:37

## 2020-09-22 RX ADMIN — OXYCODONE HYDROCHLORIDE 5 MG: 5 TABLET ORAL at 18:24

## 2020-09-22 RX ADMIN — MIDAZOLAM 2 MG: 1 INJECTION INTRAMUSCULAR; INTRAVENOUS at 15:10

## 2020-09-22 RX ADMIN — Medication 80 MG: at 15:14

## 2020-09-22 RX ADMIN — SODIUM CHLORIDE, POTASSIUM CHLORIDE, SODIUM LACTATE AND CALCIUM CHLORIDE: 600; 310; 30; 20 INJECTION, SOLUTION INTRAVENOUS at 17:03

## 2020-09-22 RX ADMIN — PROPOFOL: 10 INJECTION, EMULSION INTRAVENOUS at 16:47

## 2020-09-22 RX ADMIN — LIDOCAINE HYDROCHLORIDE 40 MG: 10 INJECTION, SOLUTION INFILTRATION; PERINEURAL at 15:13

## 2020-09-22 RX ADMIN — FENTANYL CITRATE 100 MCG: 50 INJECTION, SOLUTION INTRAMUSCULAR; INTRAVENOUS at 15:13

## 2020-09-22 RX ADMIN — CEFAZOLIN SODIUM 2 G: 2 INJECTION, SOLUTION INTRAVENOUS at 15:10

## 2020-09-22 RX ADMIN — FENTANYL CITRATE 25 MCG: 0.05 INJECTION, SOLUTION INTRAMUSCULAR; INTRAVENOUS at 18:04

## 2020-09-22 RX ADMIN — PROPOFOL 170 MG: 10 INJECTION, EMULSION INTRAVENOUS at 15:13

## 2020-09-22 RX ADMIN — PROPOFOL 50 MCG/KG/MIN: 10 INJECTION, EMULSION INTRAVENOUS at 15:18

## 2020-09-22 RX ADMIN — FENTANYL CITRATE 50 MCG: 0.05 INJECTION, SOLUTION INTRAMUSCULAR; INTRAVENOUS at 17:50

## 2020-09-22 RX ADMIN — Medication 20 MG: at 15:50

## 2020-09-22 RX ADMIN — SODIUM CHLORIDE, POTASSIUM CHLORIDE, SODIUM LACTATE AND CALCIUM CHLORIDE: 600; 310; 30; 20 INJECTION, SOLUTION INTRAVENOUS at 14:55

## 2020-09-22 RX ADMIN — Medication 10 MG: at 16:44

## 2020-09-22 RX ADMIN — GLYCOPYRROLATE 0.2 MG: 0.2 INJECTION, SOLUTION INTRAMUSCULAR; INTRAVENOUS at 15:13

## 2020-09-22 RX ADMIN — DEXAMETHASONE SODIUM PHOSPHATE 4 MG: 4 INJECTION, SOLUTION INTRA-ARTICULAR; INTRALESIONAL; INTRAMUSCULAR; INTRAVENOUS; SOFT TISSUE at 15:13

## 2020-09-22 RX ADMIN — HYDROMORPHONE HYDROCHLORIDE 0.5 MG: 1 INJECTION, SOLUTION INTRAMUSCULAR; INTRAVENOUS; SUBCUTANEOUS at 16:56

## 2020-09-22 ASSESSMENT — MIFFLIN-ST. JEOR: SCORE: 1830.34

## 2020-09-22 NOTE — ANESTHESIA PREPROCEDURE EVALUATION
Anesthesia Pre-Procedure Evaluation    Patient: Kristin Mane   MRN: [id number] : 1985          Preoperative Diagnosis: Distal radial fracture [S52.509A]    Procedure(s):  OPEN REDUCTION INTERNAL FIXATION, FRACTURE, WRIST with repair of distal radial ulnar joint ligament    History reviewed. No pertinent past medical history.  Past Surgical History:   Procedure Laterality Date     ORTHOPEDIC SURGERY Right     fracture leg     Anesthesia Evaluation     . Pt has not had prior anesthetic            ROS/MED HX    ENT/Pulmonary:  - neg pulmonary ROS     Neurologic:  - neg neurologic ROS     Cardiovascular:  - neg cardiovascular ROS       METS/Exercise Tolerance:     Hematologic:  - neg hematologic  ROS       Musculoskeletal:   (+) fracture upper extremity,  -       GI/Hepatic:  - neg GI/hepatic ROS       Renal/Genitourinary:  - ROS Renal section negative       Endo:  - neg endo ROS       Psychiatric:         Infectious Disease:  - neg infectious disease ROS       Malignancy:         Other:                          Physical Exam  Normal systems: cardiovascular and pulmonary    Airway   Mallampati: II  TM distance: >3 FB  Neck ROM: full    Dental     Cardiovascular       Pulmonary             Lab Results   Component Value Date    WBC 11.6 (H) 2015    HGB 15.3 2015    HCT 43.1 2015     2015     2015    POTASSIUM 3.4 2015    CHLORIDE 100 2015    CO2 25 2015    BUN 7 2015    CR 0.80 2015     (H) 2015    EYAL 9.1 2015    ALBUMIN 3.3 (L) 2015    PROTTOTAL 8.7 2015    ALT 23 2015    AST 26 2015    ALKPHOS 124 2015    BILITOTAL 0.4 2015    LIPASE 91 2015    HCG Negative 2020    HCGS Negative 2015       Preop Vitals  BP Readings from Last 3 Encounters:   20 (!) 175/94   20 (!) 145/91   05/18/15 (!) 149/99    Pulse Readings from Last 3 Encounters:   20 121  "  09/20/20 82   05/17/15 117      Resp Readings from Last 3 Encounters:   09/22/20 24   09/20/20 16   05/18/15 18    SpO2 Readings from Last 3 Encounters:   09/22/20 100%   09/20/20 96%   05/18/15 96%      Temp Readings from Last 1 Encounters:   09/22/20 95.5  F (35.3  C) (Temporal)    Ht Readings from Last 1 Encounters:   09/22/20 1.651 m (5' 5\")      Wt Readings from Last 1 Encounters:   09/22/20 112.9 kg (249 lb)    Estimated body mass index is 41.44 kg/m  as calculated from the following:    Height as of this encounter: 1.651 m (5' 5\").    Weight as of this encounter: 112.9 kg (249 lb).       Anesthesia Plan      History & Physical Review  History and physical reviewed and following examination; no interval change.    ASA Status:  2 .    NPO Status:  > 8 hours    Plan for General with Intravenous and Propofol induction. Maintenance will be Balanced.    PONV prophylaxis:  Ondansetron (or other 5HT-3) and Dexamethasone or Solumedrol  Declines ax block, desires GA        Postoperative Care  Postoperative pain management:  IV analgesics.      Consents  Anesthetic plan, risks, benefits and alternatives discussed with:  Patient..                 Jonathan Mark MD                    .  "

## 2020-09-22 NOTE — ANESTHESIA CARE TRANSFER NOTE
Patient: Kristin Mane    Procedure(s):  OPEN REDUCTION INTERNAL FIXATION, FRACTURE, WRIST with repair of distal radial ulnar joint ligament    Diagnosis: Distal radial fracture [S52.509A]  Diagnosis Additional Information: No value filed.    Anesthesia Type:   No value filed.     Note:  Airway :Face Mask  Patient transferred to:PACU  Comments: VSS.  Spontaneously breathing O2 per simple face mask.  Report given to RN.Handoff Report: Identifed the Patient, Identified the Reponsible Provider, Reviewed the pertinent medical history, Discussed the surgical course, Reviewed Intra-OP anesthesia mangement and issues during anesthesia, Set expectations for post-procedure period and Allowed opportunity for questions and acknowledgement of understanding      Vitals: (Last set prior to Anesthesia Care Transfer)    CRNA VITALS  9/22/2020 1650 - 9/22/2020 1725      9/22/2020             Pulse:  94    SpO2:  100 %                Electronically Signed By: NIKKI Nix CRNA  September 22, 2020  5:25 PM

## 2020-09-22 NOTE — ANESTHESIA POSTPROCEDURE EVALUATION
Patient: Kristin Mane    Procedure(s):  OPEN REDUCTION INTERNAL FIXATION, FRACTURE, WRIST with repair of distal radial ulnar joint ligament    Diagnosis:Distal radial fracture [S52.509A]  Diagnosis Additional Information: No value filed.    Anesthesia Type:  No value filed.    Note:  Anesthesia Post Evaluation    Patient location during evaluation: PACU  Patient participation: Able to fully participate in evaluation  Level of consciousness: awake  Pain management: adequate  Airway patency: patent  Cardiovascular status: acceptable  Respiratory status: acceptable  Hydration status: acceptable  PONV: none             Last vitals:  Vitals:    09/22/20 1721 09/22/20 1725 09/22/20 1730   BP: (!) 162/119 (!) 159/96 (!) 175/94   Pulse: 113 112 121   Resp: 12 14 24   Temp: 95.5  F (35.3  C)     SpO2: 99% 100% 100%         Electronically Signed By: Jonathan Mark MD  September 22, 2020  5:45 PM

## 2020-09-22 NOTE — DISCHARGE INSTRUCTIONS
DR. SONA MALONE M.D.           CLINIC PHONE NUMBER:  [phone]      HOME CARE FOLLOWING MINOR SURGERY        DRESSING  Keep dressing dry and in place until your doctor instructs you to remove the dressing.    DRAINAGE  There should be minimal drainage. If bleeding should occur and soaks through the dressing apply a sterile, dry dressing over it and tape in place. If bleeding persists, apply gentle, steady pressure with your hand over the dressing for 5 minutes. If the bleeding does not stop, call your doctor.    SKIN CLOSURE  You may have stitches or special skin closures. You will be given an appointment for the removal of any external stitches. You may have stitches under the skin which will absorb. You may have steri-strips over the incision. These look like thin tapes and will peel off in 5-7 days. If they don t after 7 days, you may carefully remove them. You may shower with the steri-strips but do not soak them, as with swimming or taking a bath. A protective covering of plastic may be placed over external stitches for showering.    NOTIFY YOUR PHYSICIAN IF YOU HAVE ANY OF THE FOLLOWING SYMPTOMS:    1. Fever greater than 101 degrees  2. Excessive bleeding or drainage  3. Disruption of the skin closure  4. Swelling, redness or excessive tenderness at the site  5. Severe pain  6. Drainage that is green, yellow, thick white or has a bad odor      GENERAL ANESTHESIA OR SEDATION ADULT DISCHARGE INSTRUCTIONS   SPECIAL PRECAUTIONS FOR 24 HOURS AFTER SURGERY    IT IS NOT UNUSUAL TO FEEL LIGHT-HEADED OR FAINT, UP TO 24 HOURS AFTER SURGERY OR WHILE TAKING PAIN MEDICATION.  IF YOU HAVE THESE SYMPTOMS; SIT FOR A FEW MINUTES BEFORE STANDING AND HAVE SOMEONE ASSIST YOU WHEN YOU GET UP TO WALK OR USE THE BATHROOM.    YOU SHOULD REST AND RELAX FOR THE NEXT 24 HOURS AND YOU MUST MAKE ARRANGEMENTS TO HAVE SOMEONE STAY WITH YOU FOR AT LEAST 24 HOURS AFTER YOUR DISCHARGE.  AVOID HAZARDOUS AND STRENUOUS ACTIVITIES.  DO NOT  MAKE IMPORTANT DECISIONS FOR 24 HOURS.    DO NOT DRIVE ANY VEHICLE OR OPERATE MECHANICAL EQUIPMENT FOR 24 HOURS FOLLOWING THE END OF YOUR SURGERY.  EVEN THOUGH YOU MAY FEEL NORMAL, YOUR REACTIONS MAY BE AFFECTED BY THE MEDICATION YOU HAVE RECEIVED.    DO NOT DRINK ALCOHOLIC BEVERAGES FOR 24 HOURS FOLLOWING YOUR SURGERY.    DRINK CLEAR LIQUIDS (APPLE JUICE, GINGER ALE, 7-UP, BROTH, ETC.).  PROGRESS TO YOUR REGULAR DIET AS YOU FEEL ABLE.    YOU MAY HAVE A DRY MOUTH, A SORE THROAT, MUSCLES ACHES OR TROUBLE SLEEPING.  THESE SHOULD GO AWAY AFTER 24 HOURS.    CALL YOUR DOCTOR FOR ANY OF THE FOLLOWING:  SIGNS OF INFECTION (FEVER, GROWING TENDERNESS AT THE SURGERY SITE, A LARGE AMOUNT OF DRAINAGE OR BLEEDING, SEVERE PAIN, FOUL-SMELLING DRAINAGE, REDNESS OR SWELLING.    IT HAS BEEN OVER 8 TO 10 HOURS SINCE SURGERY AND YOU ARE STILL NOT ABLE TO URINATE (PASS WATER).     .

## 2020-09-23 NOTE — OP NOTE
Procedure Date: 09/22/2020      PREOPERATIVE DIAGNOSES:     1.  Comminuted intraarticular unstable left distal radius fracture.   2.  Distal radioulnar joint instability, Galeazzi type fracture dislocation involving the distal radioulnar joint.      POSTOPERATIVE DIAGNOSES:    1.  Comminuted intraarticular unstable left distal radius fracture.   2.  Distal radioulnar joint instability, Galeazzi type fracture dislocation involving the distal radioulnar joint.      PROCEDURE PERFORMED:     1.  Open reduction and internal fixation of comminuted intraarticular left distal radius fracture with 3 intra-articular fracture fragments fixed angle volar plate.   2.  Repair of distal radioulnar joint capsular ligaments and ORIF ulnar styloid.      SURGEON:  Raymundo Dial MD      ANESTHESIA:  General.      PREOPERATIVE HISTORY:  A 35-year-old woman with a history of a fall onto an outstretched upper extremity had pain, swelling, deformity with comminuted fracture with multiple intra-articular fracture fragments intra-articular unstable along the distal radioulnar joint with avulsion of the styloid presents at this time for operative management.      DESCRIPTION OF THE PROCEDURE:  After obtaining informed consent, he was taken to the operating room, placed on the operative table in supine position.  Induction of general anesthesia performed, preoperative antibiotics given.  Her left upper extremity was prepped and draped sterilely.  A volar incision was made in the distal forearm in preparation for fixed angle volar plating.  Dissection then proceeded down through subcutaneous tissue.  Radial artery was identified and retracted out of harm's way.  Interval between the radial artery and FCR was then entered.  Pronator quadratus was exposed.  There was some injury from the fracture to the pronator quadratus.  A long fracture extending along the styloid was identified.  The brachioradialis tendon was released to allow this  mobilization and reduction.  Volar comminution was noted along the distal radius as well as along the lunate fossa.      A reduction clamp was placed across the styloid fracture fragment.  An elevator was then placed to disimpact the lunate fossa, was then reduced.  Provisional fixation with a 0.062 K-wire was placed.  The [employer] fixed angle volar plate fashioned to fit along the volar surface of the radius.  Position of the plate was then verified into ideal location to get good subchondral support.  Bicortical screw was placed into the slotted hole.  Position of the plate was verified, the lunate fossa was then reduced anatomically and held in place.  Multiple fixed angle pegs were placed in the distal aspect of the plate, giving good subchondral support and rigid fixation into the lunate fossa.  Radial styloid fracture fragment was then reduced and additional fixed angle pegs were placed in the distal aspect of the plate and the styloid peg was then placed.  This gave rigid fixation.  Anatomic reduction was restored to the joint surface.  AP, lateral, multiple oblique images then verified that.  Interfragmentary lag screw was placed along the radial spike extending up towards the diaphysis.  At this point, 2 additional bicortical screws were placed into the proximal end of the plate for rigid fixation.  C-arm imaging verified good position of plate and screws, and anatomic reduction restored to the distal radius length, inclination and palmar tilt and anatomic position of the joint.      The distal radioulnar joint was then assessed and had instability and some crepitations.  A longitudinal incision was made along the distal aspect of the distal ulna.  Upon entering the subcutaneous tissue, the distal aspect of the ulna was devoid of soft tissue.  Capsular ligaments had been avulsed.  A dental pick was then used to retrieve the capsular ligaments.  A dorsal ulnar sensory nerve identified and retracted.   The styloid was delivered and reduced anatomically.      At this point, a 3-0 FiberWire suture was placed as a tension band through a drill hole into the distal ulna.  Two 3-0 FiberWire sutures were placed.  Sutures were then placed around the base of the styloid and more distally along the styloid.  As the styloid was held in place, the tension band was then tightened down firmly, reducing it anatomically, reducing the base of the styloid fracture to its anatomic location.  A 4-0 Monocryl suture was then used to augment the repair along the volar capsular ligaments repairing it down to the periosteum.  Excellent anatomic repair was achieved.  C-arm imaging verified anatomic position of the styloid verified and anatomic reduction of the radius.  The wounds were then irrigated.  Pronator quadratus was repaired back to its origin covering the plate nicely, and the wounds were closed with 4-0 Monocryl and 5-0 Monocryl suture.  Steri-Strips applied, a sterile dressing and a well-padded dorsal volar long arm sugar-tong splint was then applied.  He tolerated the procedure well with no complications and transferred to the recovery room in stable condition.         SONA MALONE MD             D: 2020   T: 2020   MT:       Name:     HEIDE HIGGINS   MRN:      [id number]        Account:        [id number]   :      1985           Procedure Date: 2020      Document: [id number]

## (undated) DEVICE — GLOVE PROTEXIS POWDER FREE 8.0 ORTHOPEDIC 2D73ET80

## (undated) DEVICE — DRAPE C-ARM MINI 5423

## (undated) DEVICE — PREP CHLORAPREP 26ML TINTED HI-LITE ORANGE 930815

## (undated) DEVICE — LINEN HALF SHEET 5512

## (undated) DEVICE — DRSG STERI STRIP 1/2X4" R1547

## (undated) DEVICE — PACK HAND SOP32HARMO

## (undated) DEVICE — SU VICRYL 5-0 PS-2 18" UND J495H

## (undated) DEVICE — IMP SCR CORTICAL HANDINN 3.5X14MM CS14000
Type: IMPLANTABLE DEVICE | Site: WRIST | Status: NON-FUNCTIONAL
Removed: 2020-09-22

## (undated) DEVICE — CAST PADDING 4" UNSTERILE 9044

## (undated) DEVICE — BAG CLEAR TRASH 1.3M 39X33" P4040C

## (undated) DEVICE — TOURNIQUET SGL BLADDER 18"X4" RED 5921-218-135

## (undated) DEVICE — SU FIBERWIRE 3-0 18" AR-7227-01

## (undated) DEVICE — LINEN FULL SHEET 5511

## (undated) DEVICE — SU VICRYL 4-0 PS-2 18" UND J496H

## (undated) DEVICE — CAST PADDING 4" STERILE 9044S

## (undated) DEVICE — CAST BUCKET

## (undated) DEVICE — SU VICRYL 2-0 CT-1 36" UND J945H

## (undated) DEVICE — SUCTION CANISTER MEDIVAC LINER 3000ML W/LID 65651-530

## (undated) DEVICE — SU MONOCRYL 5-0 P-3 18" UND Y493G

## (undated) DEVICE — SU MONOCRYL 4-0 PS-2 27" UND Y426H

## (undated) DEVICE — TUBING SUCTION 12"X1/4" N612

## (undated) RX ORDER — BUPIVACAINE HYDROCHLORIDE 5 MG/ML
INJECTION, SOLUTION EPIDURAL; INTRACAUDAL
Status: DISPENSED
Start: 2020-09-22

## (undated) RX ORDER — PROPOFOL 10 MG/ML
INJECTION, EMULSION INTRAVENOUS
Status: DISPENSED
Start: 2020-09-22

## (undated) RX ORDER — DEXAMETHASONE SODIUM PHOSPHATE 4 MG/ML
INJECTION, SOLUTION INTRA-ARTICULAR; INTRALESIONAL; INTRAMUSCULAR; INTRAVENOUS; SOFT TISSUE
Status: DISPENSED
Start: 2020-09-22

## (undated) RX ORDER — CEFAZOLIN SODIUM 2 G/100ML
INJECTION, SOLUTION INTRAVENOUS
Status: DISPENSED
Start: 2020-09-22

## (undated) RX ORDER — LIDOCAINE HYDROCHLORIDE 10 MG/ML
INJECTION, SOLUTION EPIDURAL; INFILTRATION; INTRACAUDAL; PERINEURAL
Status: DISPENSED
Start: 2020-09-22

## (undated) RX ORDER — GLYCOPYRROLATE 0.2 MG/ML
INJECTION INTRAMUSCULAR; INTRAVENOUS
Status: DISPENSED
Start: 2020-09-22

## (undated) RX ORDER — FENTANYL CITRATE 50 UG/ML
INJECTION, SOLUTION INTRAMUSCULAR; INTRAVENOUS
Status: DISPENSED
Start: 2020-09-22

## (undated) RX ORDER — ONDANSETRON 2 MG/ML
INJECTION INTRAMUSCULAR; INTRAVENOUS
Status: DISPENSED
Start: 2020-09-22

## (undated) RX ORDER — OXYCODONE HYDROCHLORIDE 5 MG/1
TABLET ORAL
Status: DISPENSED
Start: 2020-09-22